# Patient Record
Sex: MALE | Race: WHITE | Employment: UNEMPLOYED | URBAN - METROPOLITAN AREA
[De-identification: names, ages, dates, MRNs, and addresses within clinical notes are randomized per-mention and may not be internally consistent; named-entity substitution may affect disease eponyms.]

---

## 2017-09-29 PROBLEM — G47.00 INSOMNIA: Status: ACTIVE | Noted: 2017-09-29

## 2017-11-02 PROBLEM — E66.3 OVERWEIGHT (BMI 25.0-29.9): Status: ACTIVE | Noted: 2017-11-02

## 2017-11-02 PROBLEM — E78.2 MIXED HYPERLIPIDEMIA: Status: ACTIVE | Noted: 2017-11-02

## 2017-11-02 PROBLEM — Z72.0 TOBACCO ABUSE: Status: ACTIVE | Noted: 2017-11-02

## 2017-11-02 PROBLEM — R41.840 DIFFICULTY CONCENTRATING: Status: ACTIVE | Noted: 2017-11-02

## 2018-01-16 PROBLEM — Z72.820 POOR SLEEP: Status: ACTIVE | Noted: 2018-01-16

## 2018-01-16 PROBLEM — R53.83 FATIGUE: Status: ACTIVE | Noted: 2018-01-16

## 2018-01-16 PROBLEM — Z72.821 POOR SLEEP HYGIENE: Status: ACTIVE | Noted: 2018-01-16

## 2019-09-10 ENCOUNTER — HOSPITAL ENCOUNTER (OUTPATIENT)
Dept: PHYSICAL THERAPY | Age: 24
Discharge: HOME | End: 2019-09-10
Payer: COMMERCIAL

## 2019-09-10 PROCEDURE — 97161 PT EVAL LOW COMPLEX 20 MIN: CPT

## 2019-09-10 NOTE — THERAPY EVALUATION
Ila Nassar  : 1995  Primary: [address]  Secondary:  [address] at [address], [address] [place] [address]  [phone]   [phone]       OUTPATIENT PHYSICAL THERAPY:Initial Assessment 9/10/2019   ICD-10: Treatment Diagnosis: Chondromalacia patella , knee right M22.41, left M22.42, Pain in limb, unspecified, leg right M79.604, Pain in limb, unspecified, leg left M79.605  Precautions/Allergies:   Sulfa (sulfonamide antibiotics)   TREATMENT PLAN:  Effective Dates: 9/10/2019 TO 2019 (90 days). Frequency/Duration: 1 time a week for 90 Day(s) MEDICAL/REFERRING DIAGNOSIS:  Pain in right knee [M25.561]  Pain in left knee [M25.562]  DATE OF ONSET: 2019  REFERRING PHYSICIAN: UNKNOWN  MD Orders: Evaluate and treat  Return MD Appointment: None scheduled     INITIAL ASSESSMENT:  Mr. Yin Lindquist presents with bilateral knee pain including swelling and tenderness indicative of chondromalacia of bilateral patella. Pt has been unable to participate in his crossfit exercises pain free and therefore has not been going for the past 5 weeks although symptoms have not lessened. Pt is eager to return to all gym and recreational activities once pain and swelling has resolved. Pt travels 4 days of the week therefore will only be seen once a week and at times once every other week which will make treatment more difficult however pt has agreed to follow HEP. PROBLEM LIST (Impacting functional limitations):  1. Increased Pain  2. Decreased Flexibility/Joint Mobility  3. Edema/Girth INTERVENTIONS PLANNED: (Treatment may consist of any combination of the following)  1. Home Exercise Program (HEP)  2. Manual Therapy  3. Neuromuscular Re-education/Strengthening     GOALS: (Goals have been discussed and agreed upon with patient.)  Discharge Goals: Time Frame: 2019  1.  Pt to return to all Cross fit activities involving LE's without increased pain/difficulty. 2. Pt to improve LEFS with a score + 9 points. 3. Pt to achieve independence with HEP to maintain goals reached. OUTCOME MEASURE:   Tool Used: Lower Extremity Functional Scale (LEFS)  Score:  Initial: 43/80 Most Recent: X/80 (Date: -- )   Interpretation of Score: 20 questions each scored on a 5 point scale with 0 representing \"extreme difficulty or unable to perform\" and 4 representing \"no difficulty\". The lower the score, the greater the functional disability. 80/80 represents no disability. Minimal detectable change is 9 points. MEDICAL NECESSITY:   · Patient demonstrates good rehab potential due to higher previous functional level. REASON FOR SERVICES/OTHER COMMENTS:  · Patient with signs of inflammation and tenderness along with decreased bilateral quadricep strength and hamstring tightness, will benefit from therapy at this time. .  Total Duration:  PT Patient Time In/Time Out  Time In: 1000  Time Out: 1100    Rehabilitation Potential For Stated Goals: Good  Regarding Janice Alda Billy's therapy, I certify that the treatment plan above will be carried out by a therapist or under their direction. Thank you for this referral,  Elaine Shell, PT     Referring Physician Signature: UNKNOWN No Signature is Required for this note. PAIN/SUBJECTIVE:   Initial: Pain Intensity 1: 2  Pain Location 1: Knee  Pain Orientation 1: Right, Left  Post Session:  2/10   HISTORY:   History of Injury/Illness (Reason for Referral): Pt is a 27year old male with bilateral knee joint pain. Pt stated that the pain started in early August of 2019. He has had an x-ray of both knees that were negative. His goal from therapy is to return to normal function pain free. He reports pain at worst 5/10 described as constant and dull. Pain is worsened with most types of moving and hurts even at rest. Pain is described in the inferior border of both patella that wraps around laterally and posterior.  Pain does not radiate past the knee at this time. Past Medical History/Comorbidities:   Mr. Lolly Rodriguez  has a past medical history of Insomnia (9/29/2017) and Mixed hyperlipidemia (11/2/2017). He also has no past medical history of Anemia, Arrhythmia, Arthritis, Asthma, Autoimmune disease ([address] CAD (coronary artery disease), Calculus of kidney, Cancer ([address] Chronic kidney disease, Chronic obstructive pulmonary disease ([address] Chronic pain, Congestive heart failure ([address] Contact dermatitis and other eczema, due to unspecified cause, COPD, Dementia, Depression, Diabetes ([address] Endocrine disease, Gastrointestinal disorder, GERD (gastroesophageal reflux disease), Headache, Heart failure ([address] Hypertension, Infectious disease, Liver disease, Neurological disorder, Other ill-defined conditions(799.89), Psychiatric disorder, Psychotic disorder, PUD (peptic ulcer disease), Seizures ([address] Stroke ([address] Thromboembolus ([address] Thyroid disease, or Trauma. Mr. Lolly Rodriguez  has a past surgical history that includes hx heent. 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  Federal Law prohibits the replication, distribution or use without written permission from [facility] [employer]   Current Medications:     Voltaren (Two times a day)   Date Last Reviewed:  9/10/2019   Number of Personal Factors/Comorbidities that affect the Plan of Care: 0: LOW COMPLEXITY   EXAMINATION:   Observation/Orthostatic Postural Assessment:  Mid patella measurement right 44.25 cm, left 45 cm, Tibia tuberosity tenderness bilaterally, decreased left patella inferior mobility         ROM:     AROM(PROM) Right Left   Knee flexion 125 125   Knee extension 0 0     Strength:     Manual Muscle Test (*/5) Right Left   Knee extension 3+ 3+   Knee flexion 4 5   Hip flexion 4 5   Hip ER 4 5   Hip IR 4 5   Hip extension 5 5   Hip abduction 5 5   Hip adduction 5 5   Ankle DF 5 5   Ankle PF 5 5     (+) Patellar grind test Right and Left  (+) Young's sign Right and Left  (-) McMurrays test Right and Left   (-) Joint line tenderness       Body Structures Involved:  1. Joints  2. Muscles  3. Ligaments Body Functions Affected:  1. Sensory/Pain  2. Neuromusculoskeletal  3. Movement Related Activities and Participation Affected:  1. Mobility  2.  Self Care   Number of elements (examined above) that affect the Plan of Care: 4+: HIGH COMPLEXITY   CLINICAL PRESENTATION:   Presentation: Stable and uncomplicated: LOW COMPLEXITY   CLINICAL DECISION MAKING:   Use of outcome tool(s) and clinical judgement create a POC that gives a: Clear prediction of patient's progress: LOW COMPLEXITY

## 2019-09-20 ENCOUNTER — HOSPITAL ENCOUNTER (OUTPATIENT)
Dept: PHYSICAL THERAPY | Age: 24
Discharge: HOME | End: 2019-09-20
Payer: COMMERCIAL

## 2019-09-20 PROCEDURE — 97140 MANUAL THERAPY 1/> REGIONS: CPT

## 2019-09-20 PROCEDURE — 97110 THERAPEUTIC EXERCISES: CPT

## 2019-09-20 NOTE — PROGRESS NOTES
Randal Owens  : 1995  Primary: [address] [facility]  Secondary:  [address] at [address], [address] [place] [address]  [phone]   [phone]      OUTPATIENT PHYSICAL THERAPY: Daily Treatment Note 2019  Visit Count:  2    ICD-10: Treatment Diagnosis: Chondromalacia patella , knee right M22.41, left M22.42, Pain in limb, unspecified, leg right M79.604, Pain in limb, unspecified, leg left M79.605  Precautions/Allergies:   Sulfa (sulfonamide antibiotics)   TREATMENT PLAN:  Effective Dates: 9/10/2019 TO 2019 (90 days). Frequency/Duration: 1 time a week for 90 Day(s) MEDICAL/REFERRING DIAGNOSIS:  Pain in right knee [M25.561]  Pain in left knee [M25.562]  DATE OF ONSET: 2019  REFERRING PHYSICIAN: UNKNOWN  MD Orders: Evaluate and treat  Return      Pre-treatment Symptoms/Complaints: \"No issus with the exercises except for the squats, they hurt\"  Pain: Initial: Pain Intensity 1: 2  Pain Location 1: Knee  Pain Orientation 1: Right, Left  Post Session:  0/10   Medications Last Reviewed:  2019  Updated Objective Findings:  None Today  TREATMENT:     Therapeutic Exercise: (30 minutes):  Exercises per grid below to improve mobility and strength. Required minimal verbal cues to promote proper body mechanics. Progressed complexity of movement as indicated. Date:  19   Activity/Exercise Parameters   Bike Level 3 X 8 minutes   Calf stretch 3x30''   Rockerboard; balance and ankle mobility 5 minutes   Shuttle; squats double leg/single leg, calf stretch, heel raises 3x10 ; squats with internal and external rotation of hip   Hamstring stretch 3x30''   Quadriceps stretch 3x30''   Lunges 25'x8         Manual Therapy (10 minutes): Manual techniques to facilitate improved motion and decreased pain.  (Used abbreviations: MET - muscle energy technique; PNF - proprioceptive neuromuscular facilitation; NMR - neuromuscular re-education; a/p - anterior to posterior; p/a - posterior to anterior)   · Tool assisted patella mobilizations to both knees  · Passive stretching to bilateral hamstrings and calf  · STM to both knees  · Mconnell taping to left knee      Treatment/Session Summary:    · Response to Treatment:  Pt had some medial knee pain with poor tracking demonstrated when performing squats. Pt instructed to discontinue the squats from his HEP and start peforming lunges to improve qudriceps strength. .  · Communication/Consultation:  None today  · Equipment provided today:  None today  · Recommendations/Intent for next treatment session: Next visit will focus on progression of strengthening the quadriceps and improving left patella tracking. .    Total Treatment Billable Duration:  40 minutes  PT Patient Time In/Time Out  Time In: 1100  Time Out: Justin Deleon PT    Future Appointments   Date Time Provider Diann Hart   9/27/2019 11:00 AM BERTA Tapia   10/18/2019  1:00 PM BERTA Tapia   10/25/2019 11:00 AM BERTA Tapia

## 2019-09-27 ENCOUNTER — HOSPITAL ENCOUNTER (OUTPATIENT)
Dept: PHYSICAL THERAPY | Age: 24
Discharge: HOME | End: 2019-09-27
Payer: COMMERCIAL

## 2019-09-27 PROCEDURE — 97110 THERAPEUTIC EXERCISES: CPT

## 2019-09-27 PROCEDURE — 97140 MANUAL THERAPY 1/> REGIONS: CPT

## 2019-09-27 NOTE — PROGRESS NOTES
Blanca Welch  : 1995  Primary: [address] [facility]  Secondary:  [address] at [address], [address] [place] [address]  [phone]   [phone]      OUTPATIENT PHYSICAL THERAPY: Daily Treatment Note 2019  Visit Count:  3    ICD-10: Treatment Diagnosis: Chondromalacia patella , knee right M22.41, left M22.42, Pain in limb, unspecified, leg right M79.604, Pain in limb, unspecified, leg left M79.605  Precautions/Allergies:   Sulfa (sulfonamide antibiotics)   TREATMENT PLAN:  Effective Dates: 9/10/2019 TO 2019 (90 days). Frequency/Duration: 1 time a week for 90 Day(s) MEDICAL/REFERRING DIAGNOSIS:  Pain in right knee [M25.561]  Pain in left knee [M25.562]  DATE OF ONSET: 2019  REFERRING PHYSICIAN: UNKNOWN  MD Orders: Evaluate and treat  Return      Pre-treatment Symptoms/Complaints:  \"I feel that my knees are getting better, hurting less. Left knee is still more painful than right\" Pt looking to return to some gentle workouts in a week. He will not be seen in therapy until 2.5 weeks due to being out of town. Pain: Initial: Pain Intensity 1: 1  Pain Location 1: Knee  Pain Orientation 1: Right, Left  Post Session:  0/10   Medications Last Reviewed:  2019  Updated Objective Findings:  Bilateral quad strength 5/5  TREATMENT:     Therapeutic Exercise: (30 minutes):  Exercises per grid below to improve mobility and strength. Required minimal verbal cues to promote proper body mechanics. Progressed complexity of movement as indicated.      Date:  19   Activity/Exercise Parameters   Bike Level 4.5 X 8 minutes   Calf stretch 3x30''   Rockerboard; balance and ankle mobility 5 minutes   Shuttle; squats double leg/single leg, calf stretch, heel raises 3x10 ; squats with internal and external rotation of hip   Hamstring stretch 3x30''   Quadriceps stretch 3x30''   Lunges 25'x8   Squat on wall with physioball 3x10         Manual Therapy (10 minutes): Manual techniques to facilitate improved motion and decreased pain. (Used abbreviations: MET - muscle energy technique; PNF - proprioceptive neuromuscular facilitation; NMR - neuromuscular re-education; a/p - anterior to posterior; p/a - posterior to anterior)   · Tool assisted patella mobilizations to both knees  · Passive stretching to bilateral hamstrings and calf  · STM to both knees      Treatment/Session Summary:    · Response to Treatment:  Pain not reproduced during session . Improved mobility noted in both patellas with decreased overall tightness in hamstrings although left still tighter than right. · Communication/Consultation:  None today  · Equipment provided today:  None today  · Recommendations/Intent for next treatment session: Pt will not be seen until 10/18, given a HEP and encouraged to start exercising back at cross fit in two weeks if still pain free. .    Total Treatment Billable Duration:  40 minutes  PT Patient Time In/Time Out  Time In: 1100  Time Out: Justin 1, PT    Future Appointments   Date Time Provider Diann Hart   10/18/2019  1:00 PM Jessica Dunbar OFF [facility]   10/25/2019 11:00 AM Lorraine Botello PT [facility]

## 2019-11-11 NOTE — THERAPY DISCHARGE
Sherren Barre  : 1995  Primary: [address] [facility]  Secondary:  [address] at [address], [address] [place] [address]  [phone]   [phone]       OUTPATIENT PHYSICAL THERAPY:Discontinuation Summary 19   ICD-10: Treatment Diagnosis: Chondromalacia patella , knee right M22.41, left M22.42, Pain in limb, unspecified, leg right M79.604, Pain in limb, unspecified, leg left M79.605  Precautions/Allergies:   Sulfa (sulfonamide antibiotics)   TREATMENT PLAN:  Effective Dates: 9/10/2019 TO 2019 (90 days). Frequency/Duration: 1 time a week for 90 Day(s) MEDICAL/REFERRING DIAGNOSIS:  Pain in right knee [M25.561]  Pain in left knee [M25.562]  DATE OF ONSET: 2019  REFERRING PHYSICIAN: Annalee Villatoro MD MD Orders: Evaluate and treat  Return MD Appointment: None scheduled   As of 2019, Sherren Barre has attended 3 out of 4 scheduled visits, with 0 cancellation(s) and 1 no shows. Pt failed to return to any appointments after his last appointment. A message was left as well as an email sent for pt to contact office however no communication from patient was received therefore discharge at this time. INITIAL ASSESSMENT:  Mr. Calin Medina presents with bilateral knee pain including swelling and tenderness indicative of chondromalacia of bilateral patella. Pt has been unable to participate in his crossfit exercises pain free and therefore has not been going for the past 5 weeks although symptoms have not lessened. Pt is eager to return to all gym and recreational activities once pain and swelling has resolved. Pt travels 4 days of the week therefore will only be seen once a week and at times once every other week which will make treatment more difficult however pt has agreed to follow HEP. PROBLEM LIST (Impacting functional limitations):  1. Increased Pain  2. Decreased Flexibility/Joint Mobility  3.  Edema/Girth INTERVENTIONS PLANNED: (Treatment may consist of any combination of the following)  1. Home Exercise Program (HEP)  2. Manual Therapy  3. Neuromuscular Re-education/Strengthening     GOALS: (Goals have been discussed and agreed upon with patient.)  Discharge Goals: Time Frame: 12/9/2019  1. Pt to return to all Cross fit activities involving LE's without increased pain/difficulty. 2. Pt to improve LEFS with a score + 9 points. 3. Pt to achieve independence with HEP to maintain goals reached. OUTCOME MEASURE:   Tool Used: Lower Extremity Functional Scale (LEFS)  Score:  Initial: 43/80 Most Recent: X/80 (Date: -- )   Interpretation of Score: 20 questions each scored on a 5 point scale with 0 representing \"extreme difficulty or unable to perform\" and 4 representing \"no difficulty\". The lower the score, the greater the functional disability. 80/80 represents no disability. Minimal detectable change is 9 points. MEDICAL NECESSITY:   Discharge from therapy at this time due to non compliance.      Thank you for this referral,  Candido Em, PT

## 2022-03-18 PROBLEM — E78.2 MIXED HYPERLIPIDEMIA: Status: ACTIVE | Noted: 2017-11-02

## 2022-03-19 PROBLEM — Z72.820 POOR SLEEP: Status: ACTIVE | Noted: 2018-01-16

## 2022-03-19 PROBLEM — Z72.0 TOBACCO ABUSE: Status: ACTIVE | Noted: 2017-11-02

## 2022-03-19 PROBLEM — R53.83 FATIGUE: Status: ACTIVE | Noted: 2018-01-16

## 2022-03-19 PROBLEM — G47.00 INSOMNIA: Status: ACTIVE | Noted: 2017-09-29

## 2022-03-19 PROBLEM — E66.3 OVERWEIGHT (BMI 25.0-29.9): Status: ACTIVE | Noted: 2017-11-02

## 2022-03-19 PROBLEM — R41.840 DIFFICULTY CONCENTRATING: Status: ACTIVE | Noted: 2017-11-02

## 2022-03-20 PROBLEM — Z72.821 POOR SLEEP HYGIENE: Status: ACTIVE | Noted: 2018-01-16

## 2022-08-25 ENCOUNTER — NURSE TRIAGE (OUTPATIENT)
Dept: OTHER | Facility: CLINIC | Age: 27
End: 2022-08-25

## 2022-08-25 NOTE — TELEPHONE ENCOUNTER
Caller states \"I am currently in [place] right now and am having abdominal pain. \"     [place] [address] and has submitted patient information to nurses with active [place] licenses. Patient aware of situation and that he will be receiving a call back once one of the nurses are available. Phone number verified. Reason for Disposition  Willis Caller has already spoken with another triager or PCP (or office), and has further questions and triager able to answer questions. Patient currently in [place] [address]. Aware that a RN with an active [place] license will be calling him back shortly when they are available. Phone number verified and patient agreed with and ok with plan.     Protocols used: No Contact or Duplicate Contact Call-ADULT-[place]

## 2022-08-25 NOTE — TELEPHONE ENCOUNTER
Reason for Disposition   Constant abdominal pain lasting > 2 hours    Protocols used: Abdominal Pain - Male-ADULT-[place]    Subjective: Caller states \"I am having upper abdominal pain\"     Current Symptoms: Caller is traveling and currently in [address] [place] [address] began with LUQ abdominal pain 3 days ago. Caller has not been able to eat or drink to the discomfort. Onset: 3 days    Associated Symptoms: Pain    Pain Severity: 4/10    Temperature: NA    What has been tried: Tums    LMP: NA Pregnant: NA    Recommended disposition: See in [address] advice provided, patient verbalizes understanding; denies any other questions or concerns; instructed to call back for any new or worsening symptoms. Will go to ED. Did a location search on insurance site and unable to locate a facility in [address] [place] [address] to proceed to the nearest facility and to give Susan a call to let them know he is traveling and had a medical emergency    Attention Provider: Thank you for allowing me to participate in the care of your patient. The patient was connected to triage in response to symptoms provided. Please do not respond through this encounter as the response is not directed to a shared pool.